# Patient Record
Sex: FEMALE | URBAN - METROPOLITAN AREA
[De-identification: names, ages, dates, MRNs, and addresses within clinical notes are randomized per-mention and may not be internally consistent; named-entity substitution may affect disease eponyms.]

---

## 2020-04-07 ENCOUNTER — APPOINTMENT (OUTPATIENT)
Age: 65
Setting detail: DERMATOLOGY
End: 2020-04-08

## 2020-04-07 DIAGNOSIS — L28.0 LICHEN SIMPLEX CHRONICUS: ICD-10-CM

## 2020-04-07 DIAGNOSIS — Z71.89 OTHER SPECIFIED COUNSELING: ICD-10-CM

## 2020-04-07 DIAGNOSIS — R21 RASH AND OTHER NONSPECIFIC SKIN ERUPTION: ICD-10-CM

## 2020-04-07 DIAGNOSIS — L81.4 OTHER MELANIN HYPERPIGMENTATION: ICD-10-CM

## 2020-04-07 PROCEDURE — OTHER ORDER TESTS: OTHER

## 2020-04-07 PROCEDURE — OTHER BIOPSY BY SHAVE METHOD: OTHER

## 2020-04-07 PROCEDURE — 11102 TANGNTL BX SKIN SINGLE LES: CPT

## 2020-04-07 PROCEDURE — OTHER COUNSELING: OTHER

## 2020-04-07 PROCEDURE — OTHER REASSURANCE: OTHER

## 2020-04-07 PROCEDURE — OTHER MIPS QUALITY: OTHER

## 2020-04-07 PROCEDURE — 99203 OFFICE O/P NEW LOW 30 MIN: CPT | Mod: 25

## 2020-04-07 PROCEDURE — OTHER ADDITIONAL NOTES: OTHER

## 2020-04-07 ASSESSMENT — LOCATION SIMPLE DESCRIPTION DERM
LOCATION SIMPLE: LEFT HAND
LOCATION SIMPLE: CHEST
LOCATION SIMPLE: RIGHT SHOULDER
LOCATION SIMPLE: RIGHT HAND
LOCATION SIMPLE: RIGHT BACK

## 2020-04-07 ASSESSMENT — LOCATION DETAILED DESCRIPTION DERM
LOCATION DETAILED: LEFT RADIAL DORSAL HAND
LOCATION DETAILED: RIGHT RADIAL DORSAL HAND
LOCATION DETAILED: RIGHT POSTERIOR SHOULDER
LOCATION DETAILED: RIGHT SUPERIOR LATERAL UPPER BACK
LOCATION DETAILED: RIGHT LATERAL SUPERIOR CHEST

## 2020-04-07 ASSESSMENT — SEVERITY ASSESSMENT: SEVERITY: MILD TO MODERATE

## 2020-04-07 ASSESSMENT — LOCATION ZONE DERM
LOCATION ZONE: ARM
LOCATION ZONE: TRUNK
LOCATION ZONE: HAND

## 2020-04-07 ASSESSMENT — BSA RASH: BSA RASH: 7

## 2020-04-07 NOTE — PROCEDURE: BIOPSY BY SHAVE METHOD
Information: Selecting Yes will display possible errors in your note based on the variables you have selected. This validation is only offered as a suggestion for you. PLEASE NOTE THAT THE VALIDATION TEXT WILL BE REMOVED WHEN YOU FINALIZE YOUR NOTE. IF YOU WANT TO FAX A PRELIMINARY NOTE YOU WILL NEED TO TOGGLE THIS TO 'NO' IF YOU DO NOT WANT IT IN YOUR FAXED NOTE.
Bill For Surgical Tray: no
Was A Bandage Applied: Yes
Detail Level: Detailed
Dressing: bandage
Wound Care: Petrolatum
Type Of Destruction Used: Curettage
Curettage Text: The wound bed was treated with curettage after the biopsy was performed.
Consent: Written consent was obtained and risks were reviewed including but not limited to scarring, infection, bleeding, scabbing, incomplete removal, nerve damage and allergy to anesthesia.
Electrodesiccation And Curettage Text: The wound bed was treated with electrodesiccation and curettage after the biopsy was performed.
Anesthesia Volume In Cc (Will Not Render If 0): 0.5
Post-Care Instructions: I reviewed with the patient in detail post-care instructions. Patient is to keep the biopsy site dry overnight, and then apply bacitracin twice daily until healed. Patient may apply hydrogen peroxide soaks to remove any crusting.
Depth Of Biopsy: dermis
Billing Type: Third-Party Bill
Electrodesiccation Text: The wound bed was treated with electrodesiccation after the biopsy was performed.
Cryotherapy Text: The wound bed was treated with cryotherapy after the biopsy was performed.
Size Of Lesion In Cm: 0
Hemostasis: Aluminum Chloride
Anesthesia Type: 1% lidocaine with epinephrine
Notification Instructions: Patient will be notified of biopsy results. However, patient instructed to call the office if not contacted within 2 weeks.
Biopsy Type: H and E
Silver Nitrate Text: The wound bed was treated with silver nitrate after the biopsy was performed.
Biopsy Method: Personna blade

## 2020-04-07 NOTE — PROCEDURE: ADDITIONAL NOTES
Detail Level: Simple
Additional Notes: Patient states she was bitten by a tick (see picture in patient’s chart that she saved) in september 2019. She states she was then bitten by hundreds of small ticks in December. She states that the rash comes and goes and moves around on her body. She states she did not see a PCP about the tick bite or the rash because “she does not like doctors”. She is interested in getting serology testing for Lyme disease. She is also interested in a skin biopsy. The rash that is present today on patient’s chest and back has been present x 1-2 months, per patient. She states rash is extremely pruritic and wakes her up in the evening. Offered to prescribe patient a topical steroid to help with inflammation and pruritus, but patient deferred. Patient also deferred oral doxycycline at this time. Told patient about benefit of doing a punch biopsy over a shave biopsy, but patient deferred punch biopsy. Will wait for results of the serology and pathology tests and develop a treatment plan off of these results. Call with any issues.

## 2020-04-07 NOTE — PROCEDURE: MIPS QUALITY
Detail Level: Detailed
Quality 111:Pneumonia Vaccination Status For Older Adults: Pneumococcal Vaccination not Administered or Previously Received, Reason not Otherwise Specified
Quality 47: Advance Care Plan: Advance care planning not documented, reason not otherwise specified.
Quality 110: Preventive Care And Screening: Influenza Immunization: Influenza immunization was not ordered or administered, reason not given
Quality 226: Preventive Care And Screening: Tobacco Use: Screening And Cessation Intervention: Patient screened for tobacco use and is an ex/non-smoker
Quality 431: Preventive Care And Screening: Unhealthy Alcohol Use - Screening: Patient screened for unhealthy alcohol use using a single question and scores less than 2 times per year
Quality 130: Documentation Of Current Medications In The Medical Record: Current Medications Documented